# Patient Record
Sex: MALE | Race: WHITE
[De-identification: names, ages, dates, MRNs, and addresses within clinical notes are randomized per-mention and may not be internally consistent; named-entity substitution may affect disease eponyms.]

---

## 2009-08-20 VITALS — SYSTOLIC BLOOD PRESSURE: 5 MMHG

## 2018-02-08 ENCOUNTER — HOSPITAL ENCOUNTER (OUTPATIENT)
Dept: HOSPITAL 19 - SDCO | Age: 43
Discharge: HOME | End: 2018-02-08
Payer: MEDICARE

## 2018-02-08 VITALS — TEMPERATURE: 98 F | HEART RATE: 94 BPM | DIASTOLIC BLOOD PRESSURE: 63 MMHG | SYSTOLIC BLOOD PRESSURE: 124 MMHG

## 2018-02-08 VITALS — HEART RATE: 101 BPM | SYSTOLIC BLOOD PRESSURE: 118 MMHG | TEMPERATURE: 97.6 F | DIASTOLIC BLOOD PRESSURE: 66 MMHG

## 2018-02-08 VITALS — HEART RATE: 58 BPM | TEMPERATURE: 97.4 F | SYSTOLIC BLOOD PRESSURE: 89 MMHG | DIASTOLIC BLOOD PRESSURE: 59 MMHG

## 2018-02-08 VITALS — HEART RATE: 99 BPM | SYSTOLIC BLOOD PRESSURE: 115 MMHG | DIASTOLIC BLOOD PRESSURE: 56 MMHG

## 2018-02-08 VITALS — HEART RATE: 91 BPM | DIASTOLIC BLOOD PRESSURE: 64 MMHG | SYSTOLIC BLOOD PRESSURE: 120 MMHG

## 2018-02-08 DIAGNOSIS — K21.9: ICD-10-CM

## 2018-02-08 DIAGNOSIS — E78.5: ICD-10-CM

## 2018-02-08 DIAGNOSIS — G80.9: ICD-10-CM

## 2018-02-08 DIAGNOSIS — Z88.8: ICD-10-CM

## 2018-02-08 DIAGNOSIS — K04.7: Primary | ICD-10-CM

## 2018-02-08 DIAGNOSIS — Z88.1: ICD-10-CM

## 2018-02-08 DIAGNOSIS — F84.0: ICD-10-CM

## 2021-01-15 ENCOUNTER — HOSPITAL ENCOUNTER (INPATIENT)
Dept: HOSPITAL 19 - COL.ER | Age: 46
LOS: 4 days | DRG: 871 | End: 2021-01-19
Attending: FAMILY MEDICINE | Admitting: INTERNAL MEDICINE
Payer: MEDICARE

## 2021-01-15 VITALS — OXYGEN SATURATION: 94 %

## 2021-01-15 VITALS — OXYGEN SATURATION: 91 %

## 2021-01-15 VITALS — OXYGEN SATURATION: 92 %

## 2021-01-15 VITALS — OXYGEN SATURATION: 96 %

## 2021-01-15 VITALS — OXYGEN SATURATION: 93 %

## 2021-01-15 VITALS — OXYGEN SATURATION: 95 %

## 2021-01-15 VITALS
DIASTOLIC BLOOD PRESSURE: 56 MMHG | OXYGEN SATURATION: 93 % | HEART RATE: 77 BPM | TEMPERATURE: 99 F | SYSTOLIC BLOOD PRESSURE: 85 MMHG

## 2021-01-15 VITALS — OXYGEN SATURATION: 90 %

## 2021-01-15 VITALS — OXYGEN SATURATION: 97 %

## 2021-01-15 VITALS — OXYGEN SATURATION: 99 %

## 2021-01-15 VITALS — OXYGEN SATURATION: 95 % | TEMPERATURE: 101 F

## 2021-01-15 VITALS — OXYGEN SATURATION: 89 %

## 2021-01-15 VITALS — OXYGEN SATURATION: 87 %

## 2021-01-15 VITALS — OXYGEN SATURATION: 100 %

## 2021-01-15 VITALS — HEIGHT: 67.99 IN | WEIGHT: 170.42 LBS | BODY MASS INDEX: 25.83 KG/M2

## 2021-01-15 VITALS — SYSTOLIC BLOOD PRESSURE: 62 MMHG | HEART RATE: 79 BPM | DIASTOLIC BLOOD PRESSURE: 51 MMHG | OXYGEN SATURATION: 94 %

## 2021-01-15 VITALS — OXYGEN SATURATION: 98 %

## 2021-01-15 VITALS — DIASTOLIC BLOOD PRESSURE: 94 MMHG | TEMPERATURE: 99.7 F | HEART RATE: 74 BPM | SYSTOLIC BLOOD PRESSURE: 130 MMHG

## 2021-01-15 VITALS — OXYGEN SATURATION: 88 %

## 2021-01-15 VITALS — OXYGEN SATURATION: 50 %

## 2021-01-15 VITALS — OXYGEN SATURATION: 74 %

## 2021-01-15 VITALS — TEMPERATURE: 101.9 F

## 2021-01-15 VITALS — OXYGEN SATURATION: 79 %

## 2021-01-15 VITALS — SYSTOLIC BLOOD PRESSURE: 100 MMHG | HEART RATE: 84 BPM | DIASTOLIC BLOOD PRESSURE: 60 MMHG | TEMPERATURE: 101.9 F

## 2021-01-15 VITALS — OXYGEN SATURATION: 72 %

## 2021-01-15 VITALS — OXYGEN SATURATION: 94 % | TEMPERATURE: 101.7 F

## 2021-01-15 DIAGNOSIS — N39.0: ICD-10-CM

## 2021-01-15 DIAGNOSIS — B96.1: ICD-10-CM

## 2021-01-15 DIAGNOSIS — Z51.5: ICD-10-CM

## 2021-01-15 DIAGNOSIS — A41.89: Primary | ICD-10-CM

## 2021-01-15 DIAGNOSIS — E78.5: ICD-10-CM

## 2021-01-15 DIAGNOSIS — G80.9: ICD-10-CM

## 2021-01-15 DIAGNOSIS — J96.01: ICD-10-CM

## 2021-01-15 DIAGNOSIS — R65.20: ICD-10-CM

## 2021-01-15 DIAGNOSIS — E83.39: ICD-10-CM

## 2021-01-15 DIAGNOSIS — E87.6: ICD-10-CM

## 2021-01-15 DIAGNOSIS — F72: ICD-10-CM

## 2021-01-15 DIAGNOSIS — U07.1: ICD-10-CM

## 2021-01-15 DIAGNOSIS — J12.82: ICD-10-CM

## 2021-01-15 DIAGNOSIS — I95.9: ICD-10-CM

## 2021-01-15 DIAGNOSIS — G40.909: ICD-10-CM

## 2021-01-15 DIAGNOSIS — Z66: ICD-10-CM

## 2021-01-15 DIAGNOSIS — R13.10: ICD-10-CM

## 2021-01-15 LAB
ALBUMIN SERPL-MCNC: 3.3 GM/DL (ref 3.5–5)
ALP SERPL-CCNC: 89 U/L (ref 50–136)
ALT SERPL-CCNC: 32 U/L (ref 4–49)
ANION GAP SERPL CALC-SCNC: 11 MMOL/L (ref 7–16)
AST SERPL-CCNC: 41 U/L (ref 15–37)
BASE EXCESS BLDA CALC-SCNC: -3.9 MMOL/L (ref -2–2)
BILIRUB SERPL-MCNC: 0.7 MG/DL (ref 0–1)
BUN SERPL-MCNC: 29 MG/DL (ref 9–20)
CALCIUM SERPL-MCNC: 8.2 MG/DL (ref 8.4–10.2)
CHLORIDE SERPL-SCNC: 105 MMOL/L (ref 98–107)
CO2 BLDA-SCNC: 21 MMOL/L
CO2 SERPL-SCNC: 23 MMOL/L (ref 22–30)
CREAT SERPL-SCNC: 0.9 UMOL/L (ref 0.66–1.25)
CRP SERPL-MCNC: 25 MG/DL (ref 0–0.9)
ERYTHROCYTE [DISTWIDTH] IN BLOOD BY AUTOMATED COUNT: 14 % (ref 11.5–14.5)
GLUCOSE SERPL-MCNC: 125 MG/DL (ref 74–106)
HCO3 BLDA-SCNC: 20 MEQ/L (ref 22–26)
HCT VFR BLD AUTO: 38.9 % (ref 42–52)
HGB BLD-MCNC: 13 G/DL (ref 13.5–18)
LYMPHOCYTES NFR BLD MANUAL: 15 % (ref 20–51)
MCH RBC QN AUTO: 29 PG (ref 27–31)
MCHC RBC AUTO-ENTMCNC: 33 G/DL (ref 33–37)
MCV RBC AUTO: 87 FL (ref 80–100)
MONOCYTES NFR BLD: 3 % (ref 1.7–9.3)
MYELOCYTES NFR BLD MANUAL: 1 % (ref 0–0)
NEUTS SEG NFR BLD MANUAL: 81 % (ref 42–75.2)
PCO2 BLDA: 32.7 MMHG (ref 35–45)
PH UR STRIP.AUTO: 5 [PH] (ref 5–8)
PHENOBARB SERPL-MCNC: 37 UG/ML (ref 15–40)
PLATELET # BLD AUTO: 296 K/MM3 (ref 130–400)
PLATELET BLD QL SMEAR: NORMAL
PMV BLD AUTO: 10.6 FL (ref 7.4–10.4)
PO2 BLDA: 68.4 MMHG (ref 80–100)
POTASSIUM SERPL-SCNC: 3.6 MMOL/L (ref 3.4–5)
PROT SERPL-MCNC: 6.9 GM/DL (ref 6.4–8.2)
RBC # BLD AUTO: 4.46 M/MM3 (ref 4.2–5.6)
RBC # UR STRIP.AUTO: (no result) /UL
RBC # UR: (no result) /HPF
SAO2 % BLDA: 93.9 % (ref 92–100)
SODIUM SERPL-SCNC: 139 MMOL/L (ref 137–145)
SP GR UR STRIP.AUTO: 1.02 (ref 1–1.03)
SQUAMOUS # URNS: (no result) /HPF
TOXIC GRANULES BLD QL SMEAR: PRESENT
UA DIPSTICK PNL UR STRIP.AUTO: (no result)
URN COLLECT METHOD CLASS: (no result)

## 2021-01-15 PROCEDURE — XW033E5 INTRODUCTION OF REMDESIVIR ANTI-INFECTIVE INTO PERIPHERAL VEIN, PERCUTANEOUS APPROACH, NEW TECHNOLOGY GROUP 5: ICD-10-PCS | Performed by: FAMILY MEDICINE

## 2021-01-15 NOTE — NUR
Increased patient to 3L oxymask d/t SPO2 level of 91%. No shortness of breath
or labored breathing noted.

## 2021-01-15 NOTE — NUR
Spoke with Diya, patients sister and primary caregiver. Gave status update
on patient. Diya stated she is available at all hours to answer any
questions regarding the patients care, as was the patients mother. Diya
stated she would call throughout the night for updates but was appreciative
and compliant with care.

## 2021-01-15 NOTE — NUR
Dr. Stevenson notified of PT sustaining fever after administration of tylenol. New
ORDER RECEIVED PLEASE see eMAR.

## 2021-01-15 NOTE — NUR
Spoke with CONRADO Galan in regards to patients recent blood pressures
readings. Patient had low pressure readings of 80-90 systolic over 50-60
diastolic and maps ranging from 57 to 66. All other VS WNL. Patient alert but
drowsy. Patient has order for levophed. Discussed with CONRADO peña she would
like a LR bolus given to increase BP or to start levophed. CONRADO stated to
start levophed and she would call surgery and discuss central line placement
and call patients primary nurse back with any information. Levophed start at
2245 at 0.1mcg/kg/hr or 27.2mls/hr in peripheral line in right hand.

## 2021-01-15 NOTE — NUR
Dr. Stevenson called to notify of PT fever still above 101. No new orders received.
Tylenol to be given when time limit allows.

## 2021-01-15 NOTE — NUR
Increased patient O2 level to 4L via oxymask d/t SPO2 level of 90%. No
shortness of breath or labored breathing noted.

## 2021-01-15 NOTE — NUR
Received report from ELINOR Martines. Patient on 4L oxymask resting in bed. VS WNL.
All medications verified and all questions answered. Will resume care at this
time.

## 2021-01-16 VITALS — OXYGEN SATURATION: 94 %

## 2021-01-16 VITALS — OXYGEN SATURATION: 98 %

## 2021-01-16 VITALS — OXYGEN SATURATION: 96 %

## 2021-01-16 VITALS — OXYGEN SATURATION: 97 %

## 2021-01-16 VITALS — OXYGEN SATURATION: 93 %

## 2021-01-16 VITALS — OXYGEN SATURATION: 91 %

## 2021-01-16 VITALS — OXYGEN SATURATION: 90 %

## 2021-01-16 VITALS — SYSTOLIC BLOOD PRESSURE: 102 MMHG | DIASTOLIC BLOOD PRESSURE: 61 MMHG

## 2021-01-16 VITALS — OXYGEN SATURATION: 92 %

## 2021-01-16 VITALS — SYSTOLIC BLOOD PRESSURE: 105 MMHG | DIASTOLIC BLOOD PRESSURE: 74 MMHG

## 2021-01-16 VITALS — OXYGEN SATURATION: 95 %

## 2021-01-16 VITALS
DIASTOLIC BLOOD PRESSURE: 61 MMHG | TEMPERATURE: 99.3 F | HEART RATE: 82 BPM | OXYGEN SATURATION: 97 % | SYSTOLIC BLOOD PRESSURE: 107 MMHG

## 2021-01-16 VITALS — DIASTOLIC BLOOD PRESSURE: 79 MMHG | SYSTOLIC BLOOD PRESSURE: 123 MMHG | HEART RATE: 89 BPM | TEMPERATURE: 100.4 F

## 2021-01-16 VITALS — DIASTOLIC BLOOD PRESSURE: 72 MMHG | TEMPERATURE: 99.1 F | HEART RATE: 71 BPM | SYSTOLIC BLOOD PRESSURE: 121 MMHG

## 2021-01-16 VITALS — SYSTOLIC BLOOD PRESSURE: 120 MMHG | HEART RATE: 117 BPM | TEMPERATURE: 100.1 F | DIASTOLIC BLOOD PRESSURE: 75 MMHG

## 2021-01-16 VITALS — SYSTOLIC BLOOD PRESSURE: 117 MMHG | HEART RATE: 81 BPM | DIASTOLIC BLOOD PRESSURE: 74 MMHG | TEMPERATURE: 100 F

## 2021-01-16 VITALS — HEART RATE: 85 BPM | SYSTOLIC BLOOD PRESSURE: 136 MMHG | TEMPERATURE: 98.7 F | DIASTOLIC BLOOD PRESSURE: 70 MMHG

## 2021-01-16 VITALS — SYSTOLIC BLOOD PRESSURE: 119 MMHG | DIASTOLIC BLOOD PRESSURE: 66 MMHG

## 2021-01-16 VITALS — DIASTOLIC BLOOD PRESSURE: 80 MMHG | SYSTOLIC BLOOD PRESSURE: 127 MMHG

## 2021-01-16 LAB
ALBUMIN SERPL-MCNC: 2.9 GM/DL (ref 3.5–5)
ALP SERPL-CCNC: 108 U/L (ref 50–136)
ALT SERPL-CCNC: 28 U/L (ref 4–49)
ANION GAP SERPL CALC-SCNC: 12 MMOL/L (ref 7–16)
AST SERPL-CCNC: 40 U/L (ref 15–37)
BASE EXCESS BLDA CALC-SCNC: -3.3 MMOL/L (ref -2–2)
BILIRUB SERPL-MCNC: 0.7 MG/DL (ref 0–1)
BUN SERPL-MCNC: 16 MG/DL (ref 9–20)
CALCIUM SERPL-MCNC: 8 MG/DL (ref 8.4–10.2)
CHLORIDE SERPL-SCNC: 108 MMOL/L (ref 98–107)
CO2 BLDA-SCNC: 19.5 MMOL/L
CO2 SERPL-SCNC: 20 MMOL/L (ref 22–30)
CREAT SERPL-SCNC: 0.53 UMOL/L (ref 0.66–1.25)
ERYTHROCYTE [DISTWIDTH] IN BLOOD BY AUTOMATED COUNT: 14.4 % (ref 11.5–14.5)
GLUCOSE SERPL-MCNC: 129 MG/DL (ref 74–106)
HCO3 BLDA-SCNC: 18.7 MEQ/L (ref 22–26)
HCT VFR BLD AUTO: 40.4 % (ref 42–52)
HGB BLD-MCNC: 13.3 G/DL (ref 13.5–18)
LYMPHOCYTES NFR BLD MANUAL: 2 % (ref 20–51)
MCH RBC QN AUTO: 29 PG (ref 27–31)
MCHC RBC AUTO-ENTMCNC: 33 G/DL (ref 33–37)
MCV RBC AUTO: 89 FL (ref 80–100)
MONOCYTES NFR BLD: 3 % (ref 1.7–9.3)
NEUTS BAND NFR BLD: 2 % (ref 0–10)
NEUTS SEG NFR BLD MANUAL: 93 % (ref 42–75.2)
PCO2 BLDA: 26 MMHG (ref 35–45)
PLATELET # BLD AUTO: 371 K/MM3 (ref 130–400)
PLATELET BLD QL SMEAR: NORMAL
PMV BLD AUTO: 10.5 FL (ref 7.4–10.4)
PO2 BLDA: 59.5 MMHG (ref 80–100)
POTASSIUM SERPL-SCNC: 3.2 MMOL/L (ref 3.4–5)
PROT SERPL-MCNC: 6.2 GM/DL (ref 6.4–8.2)
RBC # BLD AUTO: 4.52 M/MM3 (ref 4.2–5.6)
SAO2 % BLDA: 92.4 % (ref 92–100)
SODIUM SERPL-SCNC: 140 MMOL/L (ref 137–145)

## 2021-01-16 NOTE — NUR
Nurse noted patient had pulled on NG tube and dislodged it. Nurse reinserted
NG tube to previous position and placed orders for xray to confirm placement.

## 2021-01-16 NOTE — NUR
EVALUATED PT. OXYMASK TURNED DOWN TO 6L. LR RATE CHANGED TO 75. KCL
REPLACEMENT ORDERED. TYPE AND SCREEN DRAWN FOR CONVELESCENT PLASMA. SISTER
BALDO CALLED UPDATED AND CONSENT RECEIVED FOR PLASMA.

## 2021-01-16 NOTE — NUR
Spoke with CONRADO Galan and gave update on patient status. SPO2 dropped into
the low 80s and patient becoming tachycardic in the 120s, received orders for
ABG draw. All other VS WNL

## 2021-01-16 NOTE — NUR
Called Dr. Manning and stated patient was started on levophed gtt at
0.1mcg/kg/hr or 27.2mls/hr d/t soft presssures and decreased MAP and would
need a central line.
Stated
levophed was running in a peripheral line in patients left forearm. Nurse
stated most recent pressure of 123/71 with a map of 89. Nurse stated pressures
have been WNL with systolic in the 120-130s and diastolic in 78-80s since
starting levophed gtt.  stated he is fine with levophed gtt running in
peripheral line for now d/t the rate being low and will come by in the morning
to place a central line.

## 2021-01-16 NOTE — NUR
CENTRAL LINE PLACE BY . CHEST XRAY DONE.  STATES LINE OK
TO USE. NOTED PTS LEFT HAND AND ARM ARE COOLER AND BLUISH IN COLOR COMPARED TO
RIGHT. PULSES STILL PALPABLE. IV'S DRAW BLOOD AND FLUSH WELL. 
NOTED.

## 2021-01-16 NOTE — NUR
LINWOOD Rodriguez) called co-guardian Jaquelineanastasiya Martins (P# [phone]) due to
patient's current status.  Patient lives at a [facility] [place] in [place], [place].  Patient receives 24/7 care and assistance
with activities of daily living and requires a wheelchair for mobility needs.
Patient has been assigned co-guardians Jaqueline and Joyce.  Paperwork is
on file.  Patient's PCP is Dr. Stevenson, and he uses [facility] for
medications.  Plan for discharge is for patient to return to group [place]. Due
to current status, social work will continue to assess for plan feasibility.
Jaqueline stated concerns about patient being transferred to the medical floor
and not receiving constant care and supervision.  Concerns were relayed to
tammie's RN.  There were no other questions or concerns at this time.  Social
work will continue to follow.

## 2021-01-16 NOTE — NUR
Called CONRADO Galan and stated results of patients recent ABG draw. Increased
patients o2 level to 8L via oxymask. Patient sating at 97% currently. No
shortness of breath or labored breathing noted. Patient has intermittent
coughing fits and is unable to cough up secretons.

## 2021-01-16 NOTE — NUR
Spoke with HEATHER on call physcian and stated patient had pulled NG tube out,
nurse reinserted and got orders for xray for placement verification. Provider
noted that he would read xray and get back to nurse.

## 2021-01-16 NOTE — NUR
REPORT RECEIVED FROM ERNA ALDRICH. PT HAS NG TUBE FOR MEDS. PT IN DROPLET
ISOLATION FOR COVID. PT HAS LEVO RUNNING AT 0.04MCG/KG/MIN. AWAITING FOR
 TO PLACE CENTRAL LINE. WILL CONTIUE TO MONITOR.

## 2021-01-16 NOTE — NUR
Spoke with patients mom, Joyce, who gave code for status update on
patient. Nurse updated Joyce on patients need to increase oxygen level d/t
spo2 level in the low 80s and results of ABG, nurse also stated patients fever
is back and received tylenol for fever. Joyce stated she would call later
throughout the day.

## 2021-01-17 VITALS — OXYGEN SATURATION: 95 %

## 2021-01-17 VITALS — OXYGEN SATURATION: 94 %

## 2021-01-17 VITALS — DIASTOLIC BLOOD PRESSURE: 65 MMHG | TEMPERATURE: 99 F | SYSTOLIC BLOOD PRESSURE: 108 MMHG | HEART RATE: 86 BPM

## 2021-01-17 VITALS — OXYGEN SATURATION: 99 %

## 2021-01-17 VITALS — OXYGEN SATURATION: 96 %

## 2021-01-17 VITALS — OXYGEN SATURATION: 93 %

## 2021-01-17 VITALS — SYSTOLIC BLOOD PRESSURE: 122 MMHG | TEMPERATURE: 98.2 F | DIASTOLIC BLOOD PRESSURE: 94 MMHG | HEART RATE: 77 BPM

## 2021-01-17 VITALS — OXYGEN SATURATION: 92 %

## 2021-01-17 VITALS — OXYGEN SATURATION: 98 %

## 2021-01-17 VITALS — OXYGEN SATURATION: 97 %

## 2021-01-17 VITALS — DIASTOLIC BLOOD PRESSURE: 80 MMHG | SYSTOLIC BLOOD PRESSURE: 103 MMHG | HEART RATE: 89 BPM | TEMPERATURE: 99.8 F

## 2021-01-17 VITALS — DIASTOLIC BLOOD PRESSURE: 72 MMHG | TEMPERATURE: 98.2 F | SYSTOLIC BLOOD PRESSURE: 126 MMHG | HEART RATE: 84 BPM

## 2021-01-17 VITALS — SYSTOLIC BLOOD PRESSURE: 126 MMHG | TEMPERATURE: 98.2 F | DIASTOLIC BLOOD PRESSURE: 72 MMHG | HEART RATE: 84 BPM

## 2021-01-17 VITALS — SYSTOLIC BLOOD PRESSURE: 108 MMHG | TEMPERATURE: 98.6 F | HEART RATE: 77 BPM | DIASTOLIC BLOOD PRESSURE: 77 MMHG

## 2021-01-17 VITALS
SYSTOLIC BLOOD PRESSURE: 118 MMHG | DIASTOLIC BLOOD PRESSURE: 73 MMHG | HEART RATE: 87 BPM | TEMPERATURE: 99.9 F | OXYGEN SATURATION: 94 %

## 2021-01-17 VITALS — SYSTOLIC BLOOD PRESSURE: 115 MMHG | DIASTOLIC BLOOD PRESSURE: 71 MMHG | HEART RATE: 77 BPM | TEMPERATURE: 99.1 F

## 2021-01-17 VITALS
SYSTOLIC BLOOD PRESSURE: 113 MMHG | DIASTOLIC BLOOD PRESSURE: 61 MMHG | TEMPERATURE: 98.8 F | OXYGEN SATURATION: 95 % | HEART RATE: 78 BPM

## 2021-01-17 VITALS — OXYGEN SATURATION: 90 %

## 2021-01-17 VITALS — TEMPERATURE: 103.1 F | DIASTOLIC BLOOD PRESSURE: 68 MMHG | SYSTOLIC BLOOD PRESSURE: 128 MMHG | HEART RATE: 108 BPM

## 2021-01-17 VITALS — HEART RATE: 78 BPM | TEMPERATURE: 98.6 F | SYSTOLIC BLOOD PRESSURE: 104 MMHG | DIASTOLIC BLOOD PRESSURE: 66 MMHG

## 2021-01-17 VITALS — TEMPERATURE: 100.3 F

## 2021-01-17 VITALS — OXYGEN SATURATION: 89 %

## 2021-01-17 VITALS — SYSTOLIC BLOOD PRESSURE: 109 MMHG | DIASTOLIC BLOOD PRESSURE: 64 MMHG | HEART RATE: 80 BPM | TEMPERATURE: 99.1 F

## 2021-01-17 VITALS — OXYGEN SATURATION: 91 %

## 2021-01-17 VITALS — TEMPERATURE: 98.7 F | SYSTOLIC BLOOD PRESSURE: 108 MMHG | DIASTOLIC BLOOD PRESSURE: 76 MMHG | HEART RATE: 75 BPM

## 2021-01-17 LAB
ALBUMIN SERPL-MCNC: 2.3 GM/DL (ref 3.5–5)
ALP SERPL-CCNC: 86 U/L (ref 50–136)
ALT SERPL-CCNC: 25 U/L (ref 4–49)
ANION GAP SERPL CALC-SCNC: 6 MMOL/L (ref 7–16)
AST SERPL-CCNC: 38 U/L (ref 15–37)
BASOPHILS # BLD: 0 10*3/UL (ref 0–0.2)
BASOPHILS NFR BLD AUTO: 0.1 % (ref 0–2)
BILIRUB SERPL-MCNC: 0.6 MG/DL (ref 0–1)
BUN SERPL-MCNC: 14 MG/DL (ref 9–20)
CALCIUM SERPL-MCNC: 7.9 MG/DL (ref 8.4–10.2)
CHLORIDE SERPL-SCNC: 111 MMOL/L (ref 98–107)
CO2 SERPL-SCNC: 22 MMOL/L (ref 22–30)
CREAT SERPL-SCNC: 0.41 UMOL/L (ref 0.66–1.25)
EOSINOPHIL # BLD: 0 10*3/UL (ref 0–0.7)
EOSINOPHIL NFR BLD: 0 % (ref 0–4)
ERYTHROCYTE [DISTWIDTH] IN BLOOD BY AUTOMATED COUNT: 14.4 % (ref 11.5–14.5)
GLUCOSE SERPL-MCNC: 131 MG/DL (ref 74–106)
GRANULOCYTES # BLD AUTO: 86.8 % (ref 42.2–75.2)
HCT VFR BLD AUTO: 34.4 % (ref 42–52)
HGB BLD-MCNC: 11.4 G/DL (ref 13.5–18)
LYMPHOCYTES # BLD: 0.7 10*3/UL (ref 1.2–3.4)
LYMPHOCYTES NFR BLD: 9.2 % (ref 20–51)
MCH RBC QN AUTO: 29 PG (ref 27–31)
MCHC RBC AUTO-ENTMCNC: 33 G/DL (ref 33–37)
MCV RBC AUTO: 88 FL (ref 80–100)
MONOCYTES # BLD: 0.2 10*3/UL (ref 0.1–0.6)
MONOCYTES NFR BLD AUTO: 3 % (ref 1.7–9.3)
NEUTROPHILS # BLD: 6.4 10*3/UL (ref 1.4–6.5)
PLATELET # BLD AUTO: 301 K/MM3 (ref 130–400)
PMV BLD AUTO: 10.6 FL (ref 7.4–10.4)
POTASSIUM SERPL-SCNC: 3.6 MMOL/L (ref 3.4–5)
PROT SERPL-MCNC: 5.3 GM/DL (ref 6.4–8.2)
RBC # BLD AUTO: 3.9 M/MM3 (ref 4.2–5.6)
SODIUM SERPL-SCNC: 139 MMOL/L (ref 137–145)

## 2021-01-17 NOTE — NUR
ATTEMPTED TO CALL REPORT AT THIS TIME. RN STATES SHE IS UNABLE TO TAKE REPORT
AT THIS TIME.
CURRENTLY WORKING ON
ANOTHER ADMISSION.

## 2021-01-17 NOTE — NUR
REPORT CALLED TO ELINOR RILEY. PT TO BE TRANSFERRED AFTER CHANGE OF SHIFT AT
1900 WHEN STAFF IS AVAILABLE TO ASSIST WITH TRANSPORT.

## 2021-01-18 VITALS — OXYGEN SATURATION: 100 %

## 2021-01-18 VITALS — OXYGEN SATURATION: 99 %

## 2021-01-18 VITALS
TEMPERATURE: 101.1 F | DIASTOLIC BLOOD PRESSURE: 73 MMHG | OXYGEN SATURATION: 100 % | SYSTOLIC BLOOD PRESSURE: 118 MMHG | HEART RATE: 84 BPM

## 2021-01-18 VITALS — OXYGEN SATURATION: 98 %

## 2021-01-18 VITALS — TEMPERATURE: 102.6 F | DIASTOLIC BLOOD PRESSURE: 55 MMHG | HEART RATE: 84 BPM | SYSTOLIC BLOOD PRESSURE: 97 MMHG

## 2021-01-18 VITALS — HEART RATE: 103 BPM | DIASTOLIC BLOOD PRESSURE: 72 MMHG | TEMPERATURE: 101.3 F | SYSTOLIC BLOOD PRESSURE: 112 MMHG

## 2021-01-18 VITALS — SYSTOLIC BLOOD PRESSURE: 124 MMHG | HEART RATE: 82 BPM | DIASTOLIC BLOOD PRESSURE: 69 MMHG

## 2021-01-18 VITALS — DIASTOLIC BLOOD PRESSURE: 58 MMHG | SYSTOLIC BLOOD PRESSURE: 108 MMHG | TEMPERATURE: 98 F | HEART RATE: 97 BPM

## 2021-01-18 VITALS — OXYGEN SATURATION: 97 %

## 2021-01-18 VITALS — TEMPERATURE: 103 F | DIASTOLIC BLOOD PRESSURE: 51 MMHG | HEART RATE: 93 BPM | SYSTOLIC BLOOD PRESSURE: 93 MMHG

## 2021-01-18 VITALS — HEART RATE: 86 BPM | SYSTOLIC BLOOD PRESSURE: 119 MMHG | DIASTOLIC BLOOD PRESSURE: 70 MMHG

## 2021-01-18 VITALS — TEMPERATURE: 99.4 F

## 2021-01-18 LAB
ALBUMIN SERPL-MCNC: 2.3 GM/DL (ref 3.5–5)
ALP SERPL-CCNC: 98 U/L (ref 50–136)
ALT SERPL-CCNC: 30 U/L (ref 4–49)
ANION GAP SERPL CALC-SCNC: 5 MMOL/L (ref 7–16)
AST SERPL-CCNC: 49 U/L (ref 15–37)
BASE EXCESS BLDA CALC-SCNC: -4.5 MMOL/L (ref -2–2)
BASE EXCESS BLDA CALC-SCNC: -5.1 MMOL/L (ref -2–2)
BASOPHILS # BLD: 0 10*3/UL (ref 0–0.2)
BASOPHILS NFR BLD AUTO: 0.2 % (ref 0–2)
BILIRUB SERPL-MCNC: 0.5 MG/DL (ref 0–1)
BUN SERPL-MCNC: 16 MG/DL (ref 9–20)
CALCIUM SERPL-MCNC: 7.7 MG/DL (ref 8.4–10.2)
CHLORIDE SERPL-SCNC: 108 MMOL/L (ref 98–107)
CO2 BLDA-SCNC: 18.5 MMOL/L
CO2 BLDA-SCNC: 22 MMOL/L
CO2 SERPL-SCNC: 24 MMOL/L (ref 22–30)
CREAT SERPL-SCNC: 0.4 UMOL/L (ref 0.66–1.25)
EOSINOPHIL # BLD: 0 10*3/UL (ref 0–0.7)
EOSINOPHIL NFR BLD: 0 % (ref 0–4)
ERYTHROCYTE [DISTWIDTH] IN BLOOD BY AUTOMATED COUNT: 14.4 % (ref 11.5–14.5)
GLUCOSE SERPL-MCNC: 138 MG/DL (ref 74–106)
GRANULOCYTES # BLD AUTO: 88.8 % (ref 42.2–75.2)
HCO3 BLDA-SCNC: 17.7 MEQ/L (ref 22–26)
HCO3 BLDA-SCNC: 20.8 MEQ/L (ref 22–26)
HCT VFR BLD AUTO: 31.6 % (ref 42–52)
HGB BLD-MCNC: 10.5 G/DL (ref 13.5–18)
INHALED O2 CONCENTRATION: 70 %
LYMPHOCYTES # BLD: 0.7 10*3/UL (ref 1.2–3.4)
LYMPHOCYTES NFR BLD: 7.8 % (ref 20–51)
MAGNESIUM SERPL-MCNC: 2 MG/DL (ref 1.6–2.3)
MCH RBC QN AUTO: 30 PG (ref 27–31)
MCHC RBC AUTO-ENTMCNC: 33 G/DL (ref 33–37)
MCV RBC AUTO: 90 FL (ref 80–100)
MONOCYTES # BLD: 0.2 10*3/UL (ref 0.1–0.6)
MONOCYTES NFR BLD AUTO: 2.5 % (ref 1.7–9.3)
NEUTROPHILS # BLD: 7.4 10*3/UL (ref 1.4–6.5)
PCO2 BLDA: 26.1 MMHG (ref 35–45)
PCO2 BLDA: 39.1 MMHG (ref 35–45)
PH UR STRIP.AUTO: 5 [PH] (ref 5–8)
PHOSPHATE SERPL-MCNC: 1.2 MG/DL (ref 2.5–4.5)
PLATELET # BLD AUTO: 372 K/MM3 (ref 130–400)
PMV BLD AUTO: 10.7 FL (ref 7.4–10.4)
PO2 BLDA: 157 MMHG (ref 80–100)
PO2 BLDA: 60.6 MMHG (ref 80–100)
POTASSIUM SERPL-SCNC: 3.8 MMOL/L (ref 3.4–5)
PRE ALBUMIN: 6.6 MG/DL (ref 17.6–36)
PROT SERPL-MCNC: 5.4 GM/DL (ref 6.4–8.2)
RBC # BLD AUTO: 3.51 M/MM3 (ref 4.2–5.6)
RBC # UR: (no result) /HPF
SAO2 % BLDA: 90.9 % (ref 92–100)
SAO2 % BLDA: 99.2 % (ref 92–100)
SODIUM SERPL-SCNC: 137 MMOL/L (ref 137–145)
SP GR UR STRIP.AUTO: 1.02 (ref 1–1.03)
UA DIPSTICK PNL UR STRIP.AUTO: (no result)
URN COLLECT METHOD CLASS: (no result)

## 2021-01-18 PROCEDURE — 0BH17EZ INSERTION OF ENDOTRACHEAL AIRWAY INTO TRACHEA, VIA NATURAL OR ARTIFICIAL OPENING: ICD-10-PCS | Performed by: REGISTERED NURSE

## 2021-01-18 PROCEDURE — 5A1945Z RESPIRATORY VENTILATION, 24-96 CONSECUTIVE HOURS: ICD-10-PCS | Performed by: REGISTERED NURSE

## 2021-01-18 NOTE — NUR
ELINOR Arrington called for  report on patient now in room 3 of ICU. updates
received of mornings events. Care assumed at this time.

## 2021-01-18 NOTE — NUR
assisted with completion of physician documentation for courts
to rule on providing guardian's expanded rights for a DNR/DNI and to extubate
patient.  Worker met with Dr Sauer and spoke with Dr Stevenson and obtained verbal
permission to incude their recent progress notes to accompany court document
and provide to guardian, Jaqueline Martins, to provide to Judge Garcia.
Worker also obtained verbal permission to release records to Jaqueline Martins,
from Jaqueline Martins for court purposes.  Jaqueline stated that she spoke with
Judge Garcia and has a zoom meeting tonmeliotn.  Jaqueline states shes will
 documents this evening after Dr Luu has signed document.  Worker
collaborated with Valentin, Risk Management, and patient's nurse regarding the
above information.

## 2021-01-18 NOTE — NUR
PT LYING IN BED DOES NOT APPEAR TO BE ANY DISTRESS. VITALS ARE WNL. PULSES ARE
PALPABLE. PUPILS ARE EQUAL, ROUND AND REACTIVE. LUNGS SOUNDS AUSCULATED IN ALL
FIELD. BOWEL SOUNDS ARE PRESENT. PT DOES NOT OPEN EYES TO PAINFUL STIMULI.

## 2021-01-18 NOTE — NUR
PT IS FIGHTING VENTILATOR, COUGHING AND CHEWING ON TUBE. PT IS NOT GETTING
TIDAL VOLUMES AS SET BY RT. DR. BROWN CONTACED ABOUT PATIENT'S RESTLESSNESS
AND ALSO MAP BEING 59. ORDERS RECEIVED FOR LR BOLUS. BOLUS STARTED ALONG WITH
LEVOPHED AND FENTANYL DRIP STARTED AS ORDERED.

## 2021-01-18 NOTE — NUR
This nurse in for pt assessment and medications. Pt laying in bed, not
responding to any verbal stimuli, no response to sternal rub. Pt on 5L OM, has
NG tube in place, tube feeding at 30ml, q4 200ml flushes. Pt has leary in
place, emptied w/ night shift, draining dark mervat/tea colored urine. Pt has
triple rt subclavian central line, all ports flush well, good blood return.
VSS. Pt has increased resp rate, WOB and paradoxical breathing. Pt started
grunting as well as the morning went on. This nurse notified Dr. Sauer to
assess patient. Dr. Sauer stated to have pt moved to ICU and stat ABGs. House
notified, ELINOR Clarke ICU notified. Pt was moved to ICU 3. ELINOR Martines called for
report, updates given, all questions answered.

## 2021-01-18 NOTE — NUR
Patient arrives to ICU via bed from medical floor. Snoring resp with OM on at
6L. O2 up to 15 L\OM and jaw thrust used to open airway. Anesthesia here to
intubate patient. See MAR for medications used. 8.0 tube placed at 25cm at
teeth. + color change and BS heard by RT, condensation in tube.

## 2021-01-19 VITALS — OXYGEN SATURATION: 100 %

## 2021-01-19 VITALS — OXYGEN SATURATION: 99 %

## 2021-01-19 VITALS — OXYGEN SATURATION: 72 %

## 2021-01-19 VITALS
OXYGEN SATURATION: 99 % | DIASTOLIC BLOOD PRESSURE: 48 MMHG | SYSTOLIC BLOOD PRESSURE: 114 MMHG | TEMPERATURE: 102.2 F | HEART RATE: 137 BPM

## 2021-01-19 VITALS — OXYGEN SATURATION: 98 %

## 2021-01-19 VITALS — OXYGEN SATURATION: 96 %

## 2021-01-19 VITALS — HEART RATE: 109 BPM | TEMPERATURE: 101.2 F | SYSTOLIC BLOOD PRESSURE: 96 MMHG | DIASTOLIC BLOOD PRESSURE: 55 MMHG

## 2021-01-19 VITALS — OXYGEN SATURATION: 80 %

## 2021-01-19 VITALS — HEART RATE: 108 BPM | DIASTOLIC BLOOD PRESSURE: 65 MMHG | SYSTOLIC BLOOD PRESSURE: 114 MMHG | TEMPERATURE: 100 F

## 2021-01-19 VITALS — TEMPERATURE: 102.9 F | OXYGEN SATURATION: 100 %

## 2021-01-19 VITALS — OXYGEN SATURATION: 91 %

## 2021-01-19 VITALS — OXYGEN SATURATION: 86 %

## 2021-01-19 VITALS — OXYGEN SATURATION: 92 %

## 2021-01-19 VITALS — TEMPERATURE: 100.3 F | SYSTOLIC BLOOD PRESSURE: 104 MMHG | DIASTOLIC BLOOD PRESSURE: 57 MMHG | HEART RATE: 92 BPM

## 2021-01-19 VITALS — OXYGEN SATURATION: 55 %

## 2021-01-19 VITALS — OXYGEN SATURATION: 64 %

## 2021-01-19 VITALS — OXYGEN SATURATION: 63 %

## 2021-01-19 VITALS — OXYGEN SATURATION: 88 %

## 2021-01-19 VITALS — OXYGEN SATURATION: 67 %

## 2021-01-19 VITALS — OXYGEN SATURATION: 95 %

## 2021-01-19 VITALS — OXYGEN SATURATION: 71 %

## 2021-01-19 VITALS — OXYGEN SATURATION: 97 %

## 2021-01-19 VITALS — OXYGEN SATURATION: 69 %

## 2021-01-19 VITALS — OXYGEN SATURATION: 78 %

## 2021-01-19 LAB
ALBUMIN SERPL-MCNC: 2.1 GM/DL (ref 3.5–5)
ALP SERPL-CCNC: 84 U/L (ref 50–136)
ALT SERPL-CCNC: 32 U/L (ref 4–49)
ANION GAP SERPL CALC-SCNC: 5 MMOL/L (ref 7–16)
AST SERPL-CCNC: 36 U/L (ref 15–37)
BASE EXCESS BLDA CALC-SCNC: -2.2 MMOL/L (ref -2–2)
BASOPHILS # BLD: 0 10*3/UL (ref 0–0.2)
BASOPHILS NFR BLD AUTO: 0.3 % (ref 0–2)
BILIRUB SERPL-MCNC: 0.4 MG/DL (ref 0–1)
BUN SERPL-MCNC: 15 MG/DL (ref 9–20)
CALCIUM SERPL-MCNC: 6.9 MG/DL (ref 8.4–10.2)
CHLORIDE SERPL-SCNC: 111 MMOL/L (ref 98–107)
CO2 BLDA-SCNC: 22.8 MMOL/L
CO2 SERPL-SCNC: 24 MMOL/L (ref 22–30)
CREAT SERPL-SCNC: 0.37 UMOL/L (ref 0.66–1.25)
EOSINOPHIL # BLD: 0 10*3/UL (ref 0–0.7)
EOSINOPHIL NFR BLD: 0 % (ref 0–4)
ERYTHROCYTE [DISTWIDTH] IN BLOOD BY AUTOMATED COUNT: 14.8 % (ref 11.5–14.5)
GLUCOSE SERPL-MCNC: 150 MG/DL (ref 74–106)
GRANULOCYTES # BLD AUTO: 88.2 % (ref 42.2–75.2)
HCO3 BLDA-SCNC: 21.7 MEQ/L (ref 22–26)
HCT VFR BLD AUTO: 30.4 % (ref 42–52)
HGB BLD-MCNC: 9.9 G/DL (ref 13.5–18)
INHALED O2 CONCENTRATION: 50 %
LYMPHOCYTES # BLD: 0.9 10*3/UL (ref 1.2–3.4)
LYMPHOCYTES NFR BLD: 7.6 % (ref 20–51)
MAGNESIUM SERPL-MCNC: 1.9 MG/DL (ref 1.6–2.3)
MCH RBC QN AUTO: 30 PG (ref 27–31)
MCHC RBC AUTO-ENTMCNC: 33 G/DL (ref 33–37)
MCV RBC AUTO: 91 FL (ref 80–100)
MONOCYTES # BLD: 0.3 10*3/UL (ref 0.1–0.6)
MONOCYTES NFR BLD AUTO: 2.9 % (ref 1.7–9.3)
NEUTROPHILS # BLD: 10.1 10*3/UL (ref 1.4–6.5)
PCO2 BLDA: 34.2 MMHG (ref 35–45)
PHOSPHATE SERPL-MCNC: 2 MG/DL (ref 2.5–4.5)
PLATELET # BLD AUTO: 460 K/MM3 (ref 130–400)
PMV BLD AUTO: 10 FL (ref 7.4–10.4)
PO2 BLDA: 113.4 MMHG (ref 80–100)
POTASSIUM SERPL-SCNC: 3.7 MMOL/L (ref 3.4–5)
PROT SERPL-MCNC: 5 GM/DL (ref 6.4–8.2)
RBC # BLD AUTO: 3.34 M/MM3 (ref 4.2–5.6)
SAO2 % BLDA: 98.6 % (ref 92–100)
SODIUM SERPL-SCNC: 139 MMOL/L (ref 137–145)

## 2021-01-19 NOTE — NUR
PATIENT PULSE RATE ELEVATED PATIENT OVERIDING VENT MEDS RESUMED  FENT AT 50
MCQ/HR AND PROPOFOLOL 35 MCQ/KG/MIN

## 2021-01-19 NOTE — NUR
spoke with patient's guardian, Jaqueline Martins.  Jaqueline stated
she and her mother obtained court approval to extubate patient and let end of
life happen.  We are awaiting court documents.  Jaqueline will email them to
worker, when she obtains.  Worker offered zoom meeting, however, Jaqueline
declines and states that she, her mother and sister do not want to see patient
during this time.  Jaqueline was very appreciative of our zoom offer.  Jaqueline
stated that patient has a pre-planned  account at [facility]
and they should be called upon death.  Worker offered emotional support to
Brett and her family.  Worker collaborated with patient's nurse, Bobbi,
regarding the above information.

## 2021-01-19 NOTE — NUR
RESPIRATORY GOES INTO CHECK ON PATIENT'S STATUS WITH ET TUBE AND VENTILATOR,
ALSO FIND PATIENT TO HAVE ELEVATED TEMP 102.8. MEDS ADMINISTERED BY 0310, AT
0335 TEMP DOWN .1 WILL KEEP OBSERVING

## 2021-01-19 NOTE — NUR
1223- PALLIATIVE EXTUBATION PERFORMED BY RT WITH THIS RN PRESENT.
1247- THIS NURSE BEDSIDE NOTES A SEIZE IN VITAL SIGNS. NO HEART SOUNDS
PRESENT.
1248- DR. RAMOS NOTIFIED

## 2021-01-19 NOTE — NUR
Guardian, Jaqueline, contacted worker and emailed court approval for extubation
to allow patient to die naturally.  Worker provided the court documents to
patient's nurse, who will notify the physician.  Jaqueline states she spoke
with her mother and sister and that she is the only family member that would
like to talk to patient, via telephone and nursing will assist with this
request.  Worker offered emotional support to Jaqueline.

## 2021-01-19 NOTE — NUR
Patient .  [facility] confirmed patient is not a candidate
to donate and cleared patient for discharge to  home.  Worker
contacted Viktor Christiansen  home to  body.

## 2021-01-19 NOTE — NUR
SW staffed with nurse, reports family called and obtained the  permission
on form, and documented care. Will email form. Pallative was notified of
decision. Awaiting form.